# Patient Record
Sex: FEMALE | Race: WHITE | NOT HISPANIC OR LATINO | Employment: OTHER | URBAN - METROPOLITAN AREA
[De-identification: names, ages, dates, MRNs, and addresses within clinical notes are randomized per-mention and may not be internally consistent; named-entity substitution may affect disease eponyms.]

---

## 2021-04-08 DIAGNOSIS — Z23 ENCOUNTER FOR IMMUNIZATION: ICD-10-CM

## 2022-02-23 ENCOUNTER — APPOINTMENT (EMERGENCY)
Dept: CT IMAGING | Facility: HOSPITAL | Age: 72
End: 2022-02-23
Payer: COMMERCIAL

## 2022-02-23 ENCOUNTER — HOSPITAL ENCOUNTER (EMERGENCY)
Facility: HOSPITAL | Age: 72
Discharge: HOME | End: 2022-02-23
Attending: EMERGENCY MEDICINE | Admitting: EMERGENCY MEDICINE
Payer: COMMERCIAL

## 2022-02-23 VITALS
WEIGHT: 104 LBS | SYSTOLIC BLOOD PRESSURE: 147 MMHG | TEMPERATURE: 98.5 F | DIASTOLIC BLOOD PRESSURE: 67 MMHG | HEART RATE: 54 BPM | OXYGEN SATURATION: 99 % | RESPIRATION RATE: 14 BRPM

## 2022-02-23 DIAGNOSIS — R11.2 NAUSEA AND VOMITING: ICD-10-CM

## 2022-02-23 DIAGNOSIS — R10.9 ABDOMINAL PAIN: Primary | ICD-10-CM

## 2022-02-23 DIAGNOSIS — K80.20 CHOLELITHIASIS: ICD-10-CM

## 2022-02-23 LAB
ALBUMIN SERPL BCP-MCNC: 3.9 G/DL (ref 3.5–5)
ALP SERPL-CCNC: 72 U/L (ref 46–116)
ALT SERPL W P-5'-P-CCNC: 25 U/L (ref 12–78)
ANION GAP SERPL CALCULATED.3IONS-SCNC: 10 MMOL/L (ref 4–13)
AST SERPL W P-5'-P-CCNC: 19 U/L (ref 5–45)
BASOPHILS # BLD AUTO: 0.03 THOUSANDS/ΜL (ref 0–0.1)
BASOPHILS NFR BLD AUTO: 0 % (ref 0–1)
BILIRUB SERPL-MCNC: 0.5 MG/DL (ref 0.2–1)
BILIRUB UR QL STRIP: NEGATIVE
BUN SERPL-MCNC: 16 MG/DL (ref 5–25)
CALCIUM SERPL-MCNC: 8.4 MG/DL (ref 8.3–10.1)
CHLORIDE SERPL-SCNC: 99 MMOL/L (ref 100–108)
CLARITY UR: CLEAR
CO2 SERPL-SCNC: 25 MMOL/L (ref 21–32)
COLOR UR: YELLOW
CREAT SERPL-MCNC: 0.72 MG/DL (ref 0.6–1.3)
EOSINOPHIL # BLD AUTO: 0.01 THOUSAND/ΜL (ref 0–0.61)
EOSINOPHIL NFR BLD AUTO: 0 % (ref 0–6)
ERYTHROCYTE [DISTWIDTH] IN BLOOD BY AUTOMATED COUNT: 12.3 % (ref 11.6–15.1)
GFR SERPL CREATININE-BSD FRML MDRD: 85 ML/MIN/1.73SQ M
GLUCOSE SERPL-MCNC: 117 MG/DL (ref 65–140)
GLUCOSE UR STRIP-MCNC: NEGATIVE MG/DL
HCT VFR BLD AUTO: 39.8 % (ref 34.8–46.1)
HGB BLD-MCNC: 13.5 G/DL (ref 11.5–15.4)
HGB UR QL STRIP.AUTO: NEGATIVE
IMM GRANULOCYTES # BLD AUTO: 0.04 THOUSAND/UL (ref 0–0.2)
IMM GRANULOCYTES NFR BLD AUTO: 1 % (ref 0–2)
KETONES UR STRIP-MCNC: ABNORMAL MG/DL
LEUKOCYTE ESTERASE UR QL STRIP: NEGATIVE
LIPASE SERPL-CCNC: 62 U/L (ref 73–393)
LYMPHOCYTES # BLD AUTO: 0.78 THOUSANDS/ΜL (ref 0.6–4.47)
LYMPHOCYTES NFR BLD AUTO: 10 % (ref 14–44)
MCH RBC QN AUTO: 31.5 PG (ref 26.8–34.3)
MCHC RBC AUTO-ENTMCNC: 33.9 G/DL (ref 31.4–37.4)
MCV RBC AUTO: 93 FL (ref 82–98)
MONOCYTES # BLD AUTO: 0.27 THOUSAND/ΜL (ref 0.17–1.22)
MONOCYTES NFR BLD AUTO: 3 % (ref 4–12)
NEUTROPHILS # BLD AUTO: 6.84 THOUSANDS/ΜL (ref 1.85–7.62)
NEUTS SEG NFR BLD AUTO: 86 % (ref 43–75)
NITRITE UR QL STRIP: NEGATIVE
NRBC BLD AUTO-RTO: 0 /100 WBCS
PH UR STRIP.AUTO: 7 [PH]
PLATELET # BLD AUTO: 234 THOUSANDS/UL (ref 149–390)
PMV BLD AUTO: 9.3 FL (ref 8.9–12.7)
POTASSIUM SERPL-SCNC: 3.9 MMOL/L (ref 3.5–5.3)
PROT SERPL-MCNC: 7.3 G/DL (ref 6.4–8.2)
PROT UR STRIP-MCNC: NEGATIVE MG/DL
RBC # BLD AUTO: 4.29 MILLION/UL (ref 3.81–5.12)
SODIUM SERPL-SCNC: 134 MMOL/L (ref 136–145)
SP GR UR STRIP.AUTO: 1.01 (ref 1–1.03)
UROBILINOGEN UR QL STRIP.AUTO: 0.2 E.U./DL
WBC # BLD AUTO: 7.97 THOUSAND/UL (ref 4.31–10.16)

## 2022-02-23 PROCEDURE — 80053 COMPREHEN METABOLIC PANEL: CPT | Performed by: EMERGENCY MEDICINE

## 2022-02-23 PROCEDURE — 99284 EMERGENCY DEPT VISIT MOD MDM: CPT

## 2022-02-23 PROCEDURE — 99285 EMERGENCY DEPT VISIT HI MDM: CPT | Performed by: EMERGENCY MEDICINE

## 2022-02-23 PROCEDURE — 83690 ASSAY OF LIPASE: CPT | Performed by: EMERGENCY MEDICINE

## 2022-02-23 PROCEDURE — 74177 CT ABD & PELVIS W/CONTRAST: CPT

## 2022-02-23 PROCEDURE — 81003 URINALYSIS AUTO W/O SCOPE: CPT | Performed by: EMERGENCY MEDICINE

## 2022-02-23 PROCEDURE — 85025 COMPLETE CBC W/AUTO DIFF WBC: CPT | Performed by: EMERGENCY MEDICINE

## 2022-02-23 PROCEDURE — 93005 ELECTROCARDIOGRAM TRACING: CPT

## 2022-02-23 PROCEDURE — 96374 THER/PROPH/DIAG INJ IV PUSH: CPT

## 2022-02-23 PROCEDURE — 36415 COLL VENOUS BLD VENIPUNCTURE: CPT | Performed by: EMERGENCY MEDICINE

## 2022-02-23 PROCEDURE — G1004 CDSM NDSC: HCPCS

## 2022-02-23 PROCEDURE — 96375 TX/PRO/DX INJ NEW DRUG ADDON: CPT

## 2022-02-23 PROCEDURE — 96361 HYDRATE IV INFUSION ADD-ON: CPT

## 2022-02-23 RX ORDER — DICYCLOMINE HCL 20 MG
20 TABLET ORAL 2 TIMES DAILY PRN
Qty: 12 TABLET | Refills: 0 | Status: SHIPPED | OUTPATIENT
Start: 2022-02-23

## 2022-02-23 RX ORDER — ONDANSETRON 4 MG/1
4 TABLET, ORALLY DISINTEGRATING ORAL EVERY 6 HOURS PRN
Qty: 20 TABLET | Refills: 0 | Status: SHIPPED | OUTPATIENT
Start: 2022-02-23

## 2022-02-23 RX ORDER — KETOROLAC TROMETHAMINE 30 MG/ML
15 INJECTION, SOLUTION INTRAMUSCULAR; INTRAVENOUS ONCE
Status: COMPLETED | OUTPATIENT
Start: 2022-02-23 | End: 2022-02-23

## 2022-02-23 RX ORDER — ONDANSETRON 2 MG/ML
4 INJECTION INTRAMUSCULAR; INTRAVENOUS ONCE
Status: COMPLETED | OUTPATIENT
Start: 2022-02-23 | End: 2022-02-23

## 2022-02-23 RX ADMIN — IOHEXOL 100 ML: 350 INJECTION, SOLUTION INTRAVENOUS at 05:27

## 2022-02-23 RX ADMIN — KETOROLAC TROMETHAMINE 15 MG: 30 INJECTION, SOLUTION INTRAMUSCULAR; INTRAVENOUS at 04:47

## 2022-02-23 RX ADMIN — ONDANSETRON 4 MG: 2 INJECTION INTRAMUSCULAR; INTRAVENOUS at 04:42

## 2022-02-23 RX ADMIN — SODIUM CHLORIDE 1000 ML: 0.9 INJECTION, SOLUTION INTRAVENOUS at 04:47

## 2022-02-23 NOTE — ED PROVIDER NOTES
History  Chief Complaint   Patient presents with    Back Pain     pt c/o back pain  that radiates to abdomen  9p  pt vomited 3 times  pt also c/o of difficulty urinating     81-year-old female with no significant past medical history, not on any daily medication presents for evaluation of right-sided back and right upper quadrant abdominal pain that started at 21:00, sharp, constant, without any relieving exacerbating factors, she tried taking Tylenol without any relief is associated with nausea and some nonbilious nonbloody vomitus, no frequency urgency or dysuria, no hematuria  No similar pain in the past   Of note patient had an ultrasound of her right upper quadrant done approximately 1 month ago secondary to weight loss, was noted to have some gallstones without any pericholecystic inflammatory changes  She did not have any abdominal pain at that time  No kidney stones were noted on the ultrasound  She does not have any associated chest pain or shortness of breath, no fevers  Past surgical history includes laparoscopic tubal ligation          None       Past Medical History:   Diagnosis Date    Gall stones        Past Surgical History:   Procedure Laterality Date    ANKLE SURGERY      TUBAL LIGATION         History reviewed  No pertinent family history  I have reviewed and agree with the history as documented  E-Cigarette/Vaping    E-Cigarette Use Never User      E-Cigarette/Vaping Substances    Nicotine No     THC No     CBD No     Flavoring No     Other No     Unknown No      Social History     Tobacco Use    Smoking status: Never Smoker    Smokeless tobacco: Never Used   Vaping Use    Vaping Use: Never used   Substance Use Topics    Alcohol use: Not Currently    Drug use: Never       Review of Systems   Constitutional: Negative for appetite change and fever  HENT: Negative for rhinorrhea and sore throat  Eyes: Negative for photophobia and visual disturbance     Respiratory: Negative for cough, chest tightness and wheezing  Cardiovascular: Negative for chest pain, palpitations and leg swelling  Gastrointestinal: Positive for abdominal pain, nausea and vomiting  Negative for abdominal distention, blood in stool, constipation and diarrhea  Genitourinary: Negative for dysuria, flank pain, frequency, hematuria and urgency  Musculoskeletal: Negative for back pain  Skin: Negative for rash  Neurological: Negative for dizziness, weakness and headaches  All other systems reviewed and are negative  Physical Exam  Physical Exam  Vitals and nursing note reviewed  Constitutional:       Appearance: She is well-developed  HENT:      Head: Normocephalic and atraumatic  Eyes:      Pupils: Pupils are equal, round, and reactive to light  Cardiovascular:      Rate and Rhythm: Normal rate and regular rhythm  Heart sounds: No murmur heard  No friction rub  No gallop  Pulmonary:      Effort: Pulmonary effort is normal       Breath sounds: No wheezing or rales  Chest:      Chest wall: No tenderness  Abdominal:      General: There is no distension  Palpations: Abdomen is soft  There is no mass  Tenderness: There is abdominal tenderness  There is no guarding or rebound  Comments: Soft abdomen with tenderness in right mid abdomen without rebound or guarding   Musculoskeletal:      Cervical back: Normal range of motion and neck supple  Skin:     General: Skin is warm and dry  Neurological:      Mental Status: She is alert and oriented to person, place, and time           Vital Signs  ED Triage Vitals [02/23/22 0433]   Temperature Pulse Respirations Blood Pressure SpO2   98 5 °F (36 9 °C) 57 18 (!) 177/78 100 %      Temp src Heart Rate Source Patient Position - Orthostatic VS BP Location FiO2 (%)   -- Monitor Lying Right arm --      Pain Score       6           Vitals:    02/23/22 0433 02/23/22 0500   BP: (!) 177/78 147/67   Pulse: 57 (!) 54   Patient Position - Orthostatic VS: Lying          Visual Acuity      ED Medications  Medications   ondansetron (ZOFRAN) injection 4 mg (4 mg Intravenous Given 2/23/22 0442)   ketorolac (TORADOL) injection 15 mg (15 mg Intravenous Given 2/23/22 0447)   sodium chloride 0 9 % bolus 1,000 mL (0 mL Intravenous Stopped 2/23/22 0529)   iohexol (OMNIPAQUE) 350 MG/ML injection (SINGLE-DOSE) 100 mL (100 mL Intravenous Given 2/23/22 0527)       Diagnostic Studies  Results Reviewed     Procedure Component Value Units Date/Time    UA w Reflex to Microscopic w Reflex to Culture [[id number]]  (Abnormal) Collected: 02/23/22 0538    Lab Status: Final result Specimen: Urine, Clean Catch Updated: 02/23/22 0549     Color, UA Yellow     Clarity, UA Clear     Specific Gravity, UA 1 010     pH, UA 7 0     Leukocytes, UA Negative     Nitrite, UA Negative     Protein, UA Negative mg/dl      Glucose, UA Negative mg/dl      Ketones, UA 15 (1+) mg/dl      Urobilinogen, UA 0 2 E U /dl      Bilirubin, UA Negative     Blood, UA Negative    Comprehensive metabolic panel [[id number]]  (Abnormal) Collected: 02/23/22 0441    Lab Status: Final result Specimen: Blood from Arm, Right Updated: 02/23/22 0512     Sodium 134 mmol/L      Potassium 3 9 mmol/L      Chloride 99 mmol/L      CO2 25 mmol/L      ANION GAP 10 mmol/L      BUN 16 mg/dL      Creatinine 0 72 mg/dL      Glucose 117 mg/dL      Calcium 8 4 mg/dL      AST 19 U/L      ALT 25 U/L      Alkaline Phosphatase 72 U/L      Total Protein 7 3 g/dL      Albumin 3 9 g/dL      Total Bilirubin 0 50 mg/dL      eGFR 85 ml/min/1 73sq m     Narrative:      [place] guidelines for Chronic Kidney Disease (CKD):     Stage 1 with normal or high GFR (GFR > 90 mL/min/1 73 square meters)    Stage 2 Mild CKD (GFR = 60-89 mL/min/1 73 square meters)    Stage 3A Moderate CKD (GFR = 45-59 mL/min/1 73 square meters)    Stage 3B Moderate CKD (GFR = 30-44 mL/min/1 73 square meters)    Stage 4 Severe CKD (GFR = 15-29 mL/min/1 73 square meters)    Stage 5 End Stage CKD (GFR <15 mL/min/1 73 square meters)  Note: GFR calculation is accurate only with a steady state creatinine    Lipase [[id number]]  (Abnormal) Collected: 02/23/22 0441    Lab Status: Final result Specimen: Blood from Arm, Right Updated: 02/23/22 0512     Lipase 62 u/L     CBC and differential [[id number]]  (Abnormal) Collected: 02/23/22 0441    Lab Status: Final result Specimen: Blood from Arm, Right Updated: 02/23/22 0451     WBC 7 97 Thousand/uL      RBC 4 29 Million/uL      Hemoglobin 13 5 g/dL      Hematocrit 39 8 %      MCV 93 fL      MCH 31 5 pg      MCHC 33 9 g/dL      RDW 12 3 %      MPV 9 3 fL      Platelets 629 Thousands/uL      nRBC 0 /100 WBCs      Neutrophils Relative 86 %      Immat GRANS % 1 %      Lymphocytes Relative 10 %      Monocytes Relative 3 %      Eosinophils Relative 0 %      Basophils Relative 0 %      Neutrophils Absolute 6 84 Thousands/µL      Immature Grans Absolute 0 04 Thousand/uL      Lymphocytes Absolute 0 78 Thousands/µL      Monocytes Absolute 0 27 Thousand/µL      Eosinophils Absolute 0 01 Thousand/µL      Basophils Absolute 0 03 Thousands/µL                  CT abdomen pelvis with contrast   Final Result by Wai Michaels DO (02/23 0601)   1  Cholelithiasis without CT evidence for acute cholecystitis  If there is continued concern for acute cholecystitis, consider follow-up nuclear medicine HIDA scan to evaluate for cystic duct patency  2   Diffuse abnormal appearance of the sigmoid colon  Correlate for mild colitis, either infectious or inflammatory  Follow-up GI consultation recommended  3   Diffuse heterogeneous appearance of the liver with ill-defined area of enhancement in the inferior right lobe  Consider follow-up nonemergent right upper quadrant ultrasound  Ultimately, hepatic MRI may be necessary  4   Ill-defined enhancing lesion in the uterine fundus, likely uterine fibroid  Consider follow-up nonemergent pelvic ultrasound for further evaluation  The study was marked in [facility] for immediate notification  Workstation performed: [id number]                    Procedures  Procedures         ED Course  ED Course as of 02/23/22 0643   Wed Feb 23, 2022   0456 Procedure Note: EKG  Date/Time: 02/23/22 4:56 AM   Performed by: Severo Nay  Authorized by: Severo Nay  Indications / Diagnosis: Abdominal pain  ECG reviewed by me, the ED Provider: yes   The EKG demonstrates:  Rhythm:  sinus laure, 50  Intervals: normal intervals  Axis: normal axis  QRS/Blocks: normal QRS  ST Changes: No acute ST Changes, no STD/VU                                                  MDM  Number of Diagnoses or Management Options  Diagnosis management comments: 61-year-old female with right-sided abdominal pain will obtain lab work, imaging will re-evaluate      Disposition  Final diagnoses:   Abdominal pain   Nausea and vomiting   Cholelithiasis     Time reflects when diagnosis was documented in both MDM as applicable and the Disposition within this note     Time User Action Codes Description Comment    2/23/2022  6:11 AM Jasmin Needs Add [R10 9] Abdominal pain     2/23/2022  6:11 AM Jasmin Needs Add [R11 2] Nausea and vomiting     2/23/2022  6:13 AM [place] Needs Add [K80 20] Cholelithiasis       ED Disposition     ED Disposition Condition Date/Time Comment    Discharge Stable Wed Feb 23, 2022  6:25 AM Jessi Melchor discharge to home/self care              Follow-up Information     Follow up With Specialties Details Why Contact Info Additional Information    Malina Powell MD Family Medicine Schedule an appointment as soon as possible for a visit   [address]  [address] Emergency Department Emergency Medicine  If symptoms worsen [address] [place], [id number] [address] Emergency Department, [address] [place] [address], Brenden Dick 10    [address] [facility] Schedule an appointment as soon as possible for a visit   [facility] [address] [place] [address]  [facility] [address]  [phone]           Patient's Medications   Discharge Prescriptions    DICYCLOMINE (BENTYL) 20 MG TABLET    Take 1 tablet (20 mg total) by mouth 2 (two) times a day as needed (abdominal pain)       Start Date: 2/23/2022 End Date: --       Order Dose: 20 mg       Quantity: 12 tablet    Refills: 0    ONDANSETRON (ZOFRAN ODT) 4 MG DISINTEGRATING TABLET    Take 1 tablet (4 mg total) by mouth every 6 (six) hours as needed for nausea or vomiting       Start Date: 2/23/2022 End Date: --       Order Dose: 4 mg       Quantity: 20 tablet    Refills: 0       No discharge procedures on file      PDMP Review     None          ED Provider  Electronically Signed by           Mary Ellen Davies DO  02/23/22 6672

## 2022-02-24 LAB
ATRIAL RATE: 54 BPM
P AXIS: 68 DEGREES
PR INTERVAL: 146 MS
QRS AXIS: 74 DEGREES
QRSD INTERVAL: 86 MS
QT INTERVAL: 454 MS
QTC INTERVAL: 430 MS
T WAVE AXIS: 62 DEGREES
VENTRICULAR RATE: 54 BPM

## 2022-02-24 PROCEDURE — 93010 ELECTROCARDIOGRAM REPORT: CPT | Performed by: INTERNAL MEDICINE

## 2022-03-03 ENCOUNTER — OFFICE VISIT (OUTPATIENT)
Dept: GASTROENTEROLOGY | Facility: CLINIC | Age: 72
End: 2022-03-03
Payer: COMMERCIAL

## 2022-03-03 VITALS
SYSTOLIC BLOOD PRESSURE: 122 MMHG | WEIGHT: 103.4 LBS | HEIGHT: 59 IN | BODY MASS INDEX: 20.84 KG/M2 | DIASTOLIC BLOOD PRESSURE: 84 MMHG

## 2022-03-03 DIAGNOSIS — R10.11 RIGHT UPPER QUADRANT PAIN: Primary | ICD-10-CM

## 2022-03-03 DIAGNOSIS — R93.89 ABNORMAL CT SCAN: ICD-10-CM

## 2022-03-03 DIAGNOSIS — R10.9 FLANK PAIN: ICD-10-CM

## 2022-03-03 PROCEDURE — 99204 OFFICE O/P NEW MOD 45 MIN: CPT | Performed by: NURSE PRACTITIONER

## 2022-03-03 NOTE — PROGRESS NOTES
[facility] - Outpatient Consultation  Elke Gonzalez 73 y o  female MRN: [id number]  Encounter: [id number]    ASSESSMENT AND PLAN:    1  Right back pain radiating to right upper quadrant  2 Cholelithiasis  Recent acute episode of severe right flank pain radiating laterally to right upper quadrant   Recent abdominal bloating and frequent belching   CT scan in the ED showed cholelithiasis but no acute cholecystitis   LFTs, CBC within normal limits  Possible biliary colic  Would also consider esophagitis, gastritis, PUD  - check HIDA scan to rule out cholecystitis  - scheduled for EGD at [facility]  - recommend low-fat, high-protein diet    3  Colitis/ constipation  4  Weight loss  5  Screening for colon cancer   12-15 lb weight loss over past year with no change in appetite  Recent CT scan of the abdomen showed abnormality at sigmoid colon-possible colitis, increased fecal burden  Patient reports recent constipation  Last colonoscopy was done at [facility] in 2010 -diverticulosis torturous colon, redundant colon  10 year recall  Overdue for screening  Recommend proceeding with colonoscopy to rule out colon malignancy, large polyps  -scheduled for combo EGD / colonoscopy at [facility]  - continue MiraLax, increase to b i d  Followup Appointment:  2 months  ______________________________________________________________________    Chief Complaint   Patient presents with    ED at [address] [place] [address] right, lower back pain       HPI:   Elke Gonzalez is a 73y o  year old female who presents after recent ED visit due to severe  right flank pain x 24 hours which radiated laterally to  Right ribcage  She denies right upper quadrant or abdominal pain but reports nausea and vomiting   Was unable to sleep due to pain    CT scan abdomen in ED 2/23 showed mild sigmoid colitis,  Coli lithiasis without evidence of acute cholecystitis, ill-defined area of enhancement in the inferior right lobe of liver  Also noted to have increased fecal burden, up to level is sigmoid colon  She received IV Toradol with resolution of her pain    Since then, she is  experiencing frequent belching, early satiety and upper abdominal bloating  Reports mild epigastric discomfort after eating but denies esophageal burning or reflux  Describes right upper quadrant cramping or tightness, no furtherr back pain  Apetite is fair, no further nausea or vomiting  She has had 12-15 lb weight loss over the past year  PCP ordered abdominal ultrasound in January 2022 which showed cholelithiasis but no other abnormal finding  Also experiencing constipation over the past 1-2 months  Reports hard firm bowel movement every 1-2 days, no melena or hematochezia  Has been taking MiraLax daily x5 days and reports soft, decreased caliber stools    Last colonoscopy was done at [facility] in 2010 -diverticulosis torturous colon, redundant colon  10 year recall    Historical Information   Past Medical History:   Diagnosis Date    Gall stones      Past Surgical History:   Procedure Laterality Date    ANKLE SURGERY      TUBAL LIGATION       Social History     Substance and Sexual Activity   Alcohol Use Not Currently     Social History     Substance and Sexual Activity   Drug Use Never     Social History     Tobacco Use   Smoking Status Never Smoker   Smokeless Tobacco Never Used     Family History   Problem Relation Age of Onset    COPD Mother     Hyperlipidemia Mother     Colon polyps Father     Hypertension Father     Hyperlipidemia Father     Hypertension Sister     Hyperlipidemia Sister     Colon cancer Neg Hx        Meds/Allergies     Current Outpatient Medications:     dicyclomine (BENTYL) 20 mg tablet    ondansetron (Zofran ODT) 4 mg disintegrating tablet    No Known Allergies    PHYSICAL EXAM:    Blood pressure 122/84, height 4' 11" (1 499 m), weight 46 9 kg (103 lb 6 4 oz)   Body mass index is 20 88 kg/m²  General Appearance: NAD, cooperative, alert  Eyes: Anicteric  ENT:  Normocephalic, atraumatic, normal mucosa  Neck:  Supple, symmetrical, trachea midline,   Resp:  Clear to auscultation bilaterally; no rales, rhonchi or wheezing; respirations unlabored   CV:  S1 S2, Regular rate and rhythm; no murmur, rub, or gallop  GI:  Soft, non-tender, non-distended; normal bowel sounds; no masses, no organomegaly   Rectal: Deferred  Musculoskeletal: No cyanosis, clubbing or edema  Normal ROM  Skin:  No jaundice, rashes, or lesions   Psych: Normal affect, good eye contact  Neuro: No gross deficits, AAOx3    Lab Results:   Lab Results   Component Value Date    WBC 7 97 02/23/2022    HGB 13 5 02/23/2022    HCT 39 8 02/23/2022    MCV 93 02/23/2022     02/23/2022     Lab Results   Component Value Date    K 3 9 02/23/2022    CL 99 (L) 02/23/2022    CO2 25 02/23/2022    BUN 16 02/23/2022    CREATININE 0 72 02/23/2022    CALCIUM 8 4 02/23/2022    AST 19 02/23/2022    ALT 25 02/23/2022    ALKPHOS 72 02/23/2022    EGFR 85 02/23/2022     No results found for: IRON, TIBC, FERRITIN  Lab Results   Component Value Date    LIPASE 62 (L) 02/23/2022       Radiology Results:   CT abdomen pelvis with contrast    Result Date: 2/23/2022  Narrative: CT ABDOMEN AND PELVIS WITH IV CONTRAST INDICATION:   abdominal pain, vomiting, gallstones  Back pain  pt c/o back pain  that radiates to abdomen  9p  pt vomited 3 times  pt also c/o of difficulty urinating 62-year-old female with no significant past medical history, not on any daily medication presents for evaluation of right-sided back and right upper quadrant abdominal pain that started at 21:00, sharp, constant, without any relieving exacerbating factors, she tried taking Tylenol without any relief is associated with nausea and some nonbilious nonbloody vomitus, no frequency urgency or dysuria, no hematuria    No similar pain in the past   Of note patient had an ultrasound of her right upper quadrant done approximately 1 month ago secondary to weight loss, was noted to have some gallstones without any pericholecystic inflammatory changes  She did not have any abdominal pain at that time  No kidney stones were noted on the ultrasound  She does not have any associated chest pain or shortness of breath, no fevers  Past surgical history includes laparoscopic tubal ligation The patient's entire past medical history was obtained directly from either the attending emergency room physicians notes and/or the resident/physician's assistant notes in [address] [place] [address] Rd  The information was copied and pasted directly from Epic  COMPARISON:  None  TECHNIQUE:  CT examination of the abdomen and pelvis was performed  Axial, sagittal, and coronal 2D reformatted images were created from the source data and submitted for interpretation  Radiation dose length product (DLP) for this visit:  413 28 mGy-cm   This examination, like all CT scans performed in the [facility], was performed utilizing techniques to minimize radiation dose exposure, including the use of iterative  reconstruction and automated exposure control  IV Contrast:  100 mL of iohexol (OMNIPAQUE) Enteric Contrast:  Enteric contrast was not administered  FINDINGS: ABDOMEN LOWER CHEST:  No clinically significant abnormality identified in the visualized lower chest  LIVER/BILIARY TREE:  The liver is heterogeneous in CT density  Ill-defined area of heterogeneous enhancement in the inferior aspect of the liver (series 2, image 36; series 601, image 42)  No discrete hepatic mass  Mild nonspecific periportal edema  GALLBLADDER:  The gallbladder is distended  Numerous gallbladder calculi  No pericholecystic inflammation or gallbladder wall thickening  The common bile duct is normal in caliber  No evidence of choledocholithiasis  SPLEEN:  Unremarkable  PANCREAS:  Unremarkable  ADRENAL GLANDS:  Unremarkable   KIDNEYS/URETERS:  No hydronephrosis or urinary tract calculus  One or more sharply circumscribed subcentimeter renal hypodensities are present, too small to accurately characterize, and statistically most likely benign findings  According to recent literature (Radiology 2019) no further workup of these findings is recommended  STOMACH AND BOWEL:  Evaluation of the GI tract is somewhat limited due to lack of oral contrast material as well as lack of intra-abdominal fat  The stomach is relatively decompressed and inadequately evaluated  Hiatal hernia  No evidence of small bowel obstruction  The colon is segmentally distended with feces, up to the level of the sigmoid colon  Increased fecal burden, up to the level of the sigmoid colon  Distally, the sigmoid colon is relatively decompressed  There is mild wall edema of the sigmoid colon  Scattered diverticula throughout the entire sigmoid colon  Nothing to suggest acute diverticulitis  APPENDIX:  No findings to suggest appendicitis  ABDOMINOPELVIC CAVITY:  Trace free fluid in the pelvis  Mild edema of the mesentery  No pneumoperitoneum  No lymphadenopathy  VESSELS:  Mild atherosclerotic changes in the abdominal aorta  Mild fusiform ectasia of the infrarenal abdominal aorta, extending into the common iliac bifurcation  No evidence of aneurysm or dissection  No flow-limiting stenosis  There is mild prominence of the uterine and adnexal vessels bilaterally, consistent with pelvic venous congestion  PELVIS REPRODUCTIVE ORGANS:  The uterus is retroverted  Enhancing 1 7 cm lesion in the uterine fundus (series 2, image 57; series 602, image 86)  URINARY BLADDER:  Incompletely distended  Mild circumferential wall thickening  Given the negative urine analysis, the findings are most compatible with under distention  ABDOMINAL WALL/INGUINAL REGIONS:  Small umbilical hernia containing fat (series 602, image 92)  OSSEOUS STRUCTURES:  No acute fracture or destructive osseous lesion    Spinal degenerative changes are noted  This is most pronounced L4-L5  Vacuum disc formation at L2-L3 and L5-S1  Impression: 1  Cholelithiasis without CT evidence for acute cholecystitis  If there is continued concern for acute cholecystitis, consider follow-up nuclear medicine HIDA scan to evaluate for cystic duct patency  2   Diffuse abnormal appearance of the sigmoid colon  Correlate for mild colitis, either infectious or inflammatory  Follow-up GI consultation recommended  3   Diffuse heterogeneous appearance of the liver with ill-defined area of enhancement in the inferior right lobe  Consider follow-up nonemergent right upper quadrant ultrasound  Ultimately, hepatic MRI may be necessary  4   Ill-defined enhancing lesion in the uterine fundus, likely uterine fibroid  Consider follow-up nonemergent pelvic ultrasound for further evaluation  The study was marked in [facility] for immediate notification  Workstation performed: [id number]         REVIEW OF SYSTEMS:    CONSTITUTIONAL: Denies any fever, chills, rigors, and weight loss  HEENT: No earache or tinnitus  Denies hearing loss or visual disturbances  CARDIOVASCULAR: No chest pain or palpitations  RESPIRATORY: Denies any cough, hemoptysis, shortness of breath or dyspnea on exertion  GASTROINTESTINAL: As noted in the History of Present Illness  GENITOURINARY: No problems with urination  Denies any hematuria or dysuria  NEUROLOGIC: No dizziness or vertigo, denies headaches  MUSCULOSKELETAL: Denies any muscle or joint pain  SKIN: Denies skin rashes or itching  ENDOCRINE: Denies excessive thirst  Denies intolerance to heat or cold  PSYCHOSOCIAL: Denies depression or anxiety  Denies any recent memory loss

## 2022-03-03 NOTE — TELEPHONE ENCOUNTER
Scheduled date of colonoscopy (as of today):4/19/22  Physician performing colonoscopy:Dr Rolan Fernandes  Location of colonoscopy: [facility] endoscopy  Bowel prep reviewed with patient: Clenpiq  (sample)  Instructions reviewed with patient by: Sterling Santa  Clearances: None

## 2022-03-03 NOTE — LETTER
March 3, 2022     Sumanth Crawford, [address] [place] [address]    Patient: Oksana December   YOB: 1950   Date of Visit: 3/3/2022       Dear Dr Nunn Members: Thank you for referring [facility] to me for evaluation  Below are my notes for this consultation  If you have questions, please do not hesitate to call me  I look forward to following your patient along with you  Sincerely,        ALIYAH Hernandez        CC: No Recipients  ALIYAH Hernandez  3/3/2022  5:41 PM  Sign when Signing Visit    [facility] - Outpatient Consultation  Oksana December 73 y o  female MRN: [id number]  Encounter: [id number]    ASSESSMENT AND PLAN:    1  Right back pain radiating to right upper quadrant  2 Cholelithiasis  Recent acute episode of severe right flank pain radiating laterally to right upper quadrant   Recent abdominal bloating and frequent belching   CT scan in the ED showed cholelithiasis but no acute cholecystitis   LFTs, CBC within normal limits  Possible biliary colic  Would also consider esophagitis, gastritis, PUD  - check HIDA scan to rule out cholecystitis  - scheduled for EGD at [facility]  - recommend low-fat, high-protein diet    3  Colitis/ constipation  4  Weight loss  5  Screening for colon cancer   12-15 lb weight loss over past year with no change in appetite  Recent CT scan of the abdomen showed abnormality at sigmoid colon-possible colitis, increased fecal burden  Patient reports recent constipation  Last colonoscopy was done at [facility] in 2010 -diverticulosis torturous colon, redundant colon  10 year recall  Overdue for screening  Recommend proceeding with colonoscopy to rule out colon malignancy, large polyps  -scheduled for combo EGD / colonoscopy at [facility]  - continue MiraLax, increase to b i d        Followup Appointment:  2 months  ______________________________________________________________________    Chief Complaint   Patient presents with   [facility] ED at [address] right, lower back pain       HPI:   Aparna Francois is a 73y o  year old female who presents after recent ED visit due to severe  right flank pain x 24 hours which radiated laterally to  Right ribcage  She denies right upper quadrant or abdominal pain but reports nausea and vomiting  Was unable to sleep due to pain    CT scan abdomen in ED 2/23 showed mild sigmoid colitis,  Coli lithiasis without evidence of acute cholecystitis, ill-defined area of enhancement in the inferior right lobe of liver  Also noted to have increased fecal burden, up to level is sigmoid colon  She received IV Toradol with resolution of her pain    Since then, she is  experiencing frequent belching, early satiety and upper abdominal bloating  Reports mild epigastric discomfort after eating but denies esophageal burning or reflux  Describes right upper quadrant cramping or tightness, no furtherr back pain  Apetite is fair, no further nausea or vomiting  She has had 12-15 lb weight loss over the past year  PCP ordered abdominal ultrasound in January 2022 which showed cholelithiasis but no other abnormal finding  Also experiencing constipation over the past 1-2 months  Reports hard firm bowel movement every 1-2 days, no melena or hematochezia  Has been taking MiraLax daily x5 days and reports soft, decreased caliber stools    Last colonoscopy was done at [facility] in 2010 -diverticulosis torturous colon, redundant colon   10 year recall    Historical Information   Past Medical History:   Diagnosis Date    Gall stones      Past Surgical History:   Procedure Laterality Date    ANKLE SURGERY      TUBAL LIGATION       Social History     Substance and Sexual Activity   Alcohol Use Not Currently     Social History     Substance and Sexual Activity   Drug Use Never     Social History     Tobacco Use   Smoking Status Never Smoker   Smokeless Tobacco Never Used     Family History   Problem Relation Age of Onset   Mario Montgomery COPD Mother     Hyperlipidemia Mother     Colon polyps Father     Hypertension Father     Hyperlipidemia Father     Hypertension Sister     Hyperlipidemia Sister     Colon cancer Neg Hx        Meds/Allergies     Current Outpatient Medications:     dicyclomine (BENTYL) 20 mg tablet    ondansetron (Zofran ODT) 4 mg disintegrating tablet    No Known Allergies    PHYSICAL EXAM:    Blood pressure 122/84, height 4' 11" (1 499 m), weight 46 9 kg (103 lb 6 4 oz)  Body mass index is 20 88 kg/m²  General Appearance: NAD, cooperative, alert  Eyes: Anicteric  ENT:  Normocephalic, atraumatic, normal mucosa  Neck:  Supple, symmetrical, trachea midline,   Resp:  Clear to auscultation bilaterally; no rales, rhonchi or wheezing; respirations unlabored   CV:  S1 S2, Regular rate and rhythm; no murmur, rub, or gallop  GI:  Soft, non-tender, non-distended; normal bowel sounds; no masses, no organomegaly   Rectal: Deferred  Musculoskeletal: No cyanosis, clubbing or edema  Normal ROM    Skin:  No jaundice, rashes, or lesions   Psych: Normal affect, good eye contact  Neuro: No gross deficits, AAOx3    Lab Results:   Lab Results   Component Value Date    WBC 7 97 02/23/2022    HGB 13 5 02/23/2022    HCT 39 8 02/23/2022    MCV 93 02/23/2022     02/23/2022     Lab Results   Component Value Date    K 3 9 02/23/2022    CL 99 (L) 02/23/2022    CO2 25 02/23/2022    BUN 16 02/23/2022    CREATININE 0 72 02/23/2022    CALCIUM 8 4 02/23/2022    AST 19 02/23/2022    ALT 25 02/23/2022    ALKPHOS 72 02/23/2022    EGFR 85 02/23/2022     No results found for: IRON, TIBC, FERRITIN  Lab Results   Component Value Date    LIPASE 62 (L) 02/23/2022       Radiology Results:   CT abdomen pelvis with contrast    Result Date: 2/23/2022  Narrative: CT ABDOMEN AND PELVIS WITH IV CONTRAST INDICATION:   abdominal pain, vomiting, gallstones  Back pain  pt c/o back pain  that radiates to abdomen  9p  pt vomited 3 times  pt also c/o of difficulty urinating 73-year-old female with no significant past medical history, not on any daily medication presents for evaluation of right-sided back and right upper quadrant abdominal pain that started at 21:00, sharp, constant, without any relieving exacerbating factors, she tried taking Tylenol without any relief is associated with nausea and some nonbilious nonbloody vomitus, no frequency urgency or dysuria, no hematuria  No similar pain in the past   Of note patient had an ultrasound of her right upper quadrant done approximately 1 month ago secondary to weight loss, was noted to have some gallstones without any pericholecystic inflammatory changes  She did not have any abdominal pain at that time  No kidney stones were noted on the ultrasound  She does not have any associated chest pain or shortness of breath, no fevers  Past surgical history includes laparoscopic tubal ligation The patient's entire past medical history was obtained directly from either the attending emergency room physicians notes and/or the resident/physician's assistant notes in [address] [place] [address]  The information was copied and pasted directly from Epic  COMPARISON:  None  TECHNIQUE:  CT examination of the abdomen and pelvis was performed  Axial, sagittal, and coronal 2D reformatted images were created from the source data and submitted for interpretation  Radiation dose length product (DLP) for this visit:  413 28 mGy-cm   This examination, like all CT scans performed in the [facility], was performed utilizing techniques to minimize radiation dose exposure, including the use of iterative  reconstruction and automated exposure control  IV Contrast:  100 mL of iohexol (OMNIPAQUE) Enteric Contrast:  Enteric contrast was not administered   FINDINGS: ABDOMEN LOWER CHEST:  No clinically significant abnormality identified in the visualized lower chest  LIVER/BILIARY TREE:  The liver is heterogeneous in CT density  Ill-defined area of heterogeneous enhancement in the inferior aspect of the liver (series 2, image 36; series 601, image 42)  No discrete hepatic mass  Mild nonspecific periportal edema  GALLBLADDER:  The gallbladder is distended  Numerous gallbladder calculi  No pericholecystic inflammation or gallbladder wall thickening  The common bile duct is normal in caliber  No evidence of choledocholithiasis  SPLEEN:  Unremarkable  PANCREAS:  Unremarkable  ADRENAL GLANDS:  Unremarkable  KIDNEYS/URETERS:  No hydronephrosis or urinary tract calculus  One or more sharply circumscribed subcentimeter renal hypodensities are present, too small to accurately characterize, and statistically most likely benign findings  According to recent literature (Radiology 2019) no further workup of these findings is recommended  STOMACH AND BOWEL:  Evaluation of the GI tract is somewhat limited due to lack of oral contrast material as well as lack of intra-abdominal fat  The stomach is relatively decompressed and inadequately evaluated  Hiatal hernia  No evidence of small bowel obstruction  The colon is segmentally distended with feces, up to the level of the sigmoid colon  Increased fecal burden, up to the level of the sigmoid colon  Distally, the sigmoid colon is relatively decompressed  There is mild wall edema of the sigmoid colon  Scattered diverticula throughout the entire sigmoid colon  Nothing to suggest acute diverticulitis  APPENDIX:  No findings to suggest appendicitis  ABDOMINOPELVIC CAVITY:  Trace free fluid in the pelvis  Mild edema of the mesentery  No pneumoperitoneum  No lymphadenopathy  VESSELS:  Mild atherosclerotic changes in the abdominal aorta  Mild fusiform ectasia of the infrarenal abdominal aorta, extending into the common iliac bifurcation  No evidence of aneurysm or dissection  No flow-limiting stenosis   There is mild prominence of the uterine and adnexal vessels bilaterally, consistent with pelvic venous congestion  PELVIS REPRODUCTIVE ORGANS:  The uterus is retroverted  Enhancing 1 7 cm lesion in the uterine fundus (series 2, image 57; series 602, image 86)  URINARY BLADDER:  Incompletely distended  Mild circumferential wall thickening  Given the negative urine analysis, the findings are most compatible with under distention  ABDOMINAL WALL/INGUINAL REGIONS:  Small umbilical hernia containing fat (series 602, image 92)  OSSEOUS STRUCTURES:  No acute fracture or destructive osseous lesion  Spinal degenerative changes are noted  This is most pronounced L4-L5  Vacuum disc formation at L2-L3 and L5-S1  Impression: 1  Cholelithiasis without CT evidence for acute cholecystitis  If there is continued concern for acute cholecystitis, consider follow-up nuclear medicine HIDA scan to evaluate for cystic duct patency  2   Diffuse abnormal appearance of the sigmoid colon  Correlate for mild colitis, either infectious or inflammatory  Follow-up GI consultation recommended  3   Diffuse heterogeneous appearance of the liver with ill-defined area of enhancement in the inferior right lobe  Consider follow-up nonemergent right upper quadrant ultrasound  Ultimately, hepatic MRI may be necessary  4   Ill-defined enhancing lesion in the uterine fundus, likely uterine fibroid  Consider follow-up nonemergent pelvic ultrasound for further evaluation  The study was marked in [facility] for immediate notification  Workstation performed: [id number]         REVIEW OF SYSTEMS:    CONSTITUTIONAL: Denies any fever, chills, rigors, and weight loss  HEENT: No earache or tinnitus  Denies hearing loss or visual disturbances  CARDIOVASCULAR: No chest pain or palpitations  RESPIRATORY: Denies any cough, hemoptysis, shortness of breath or dyspnea on exertion  GASTROINTESTINAL: As noted in the History of Present Illness     GENITOURINARY: No problems with urination  Denies any hematuria or dysuria  NEUROLOGIC: No dizziness or vertigo, denies headaches  MUSCULOSKELETAL: Denies any muscle or joint pain  SKIN: Denies skin rashes or itching  ENDOCRINE: Denies excessive thirst  Denies intolerance to heat or cold  PSYCHOSOCIAL: Denies depression or anxiety  Denies any recent memory loss

## 2022-03-04 RX ORDER — SODIUM PICOSULFATE, MAGNESIUM OXIDE, AND ANHYDROUS CITRIC ACID 10; 3.5; 12 MG/160ML; G/160ML; G/160ML
LIQUID ORAL
Qty: 320 ML | Refills: 0 | Status: SHIPPED | COMMUNITY
Start: 2022-03-04 | End: 2022-04-19 | Stop reason: HOSPADM

## 2022-03-10 ENCOUNTER — HOSPITAL ENCOUNTER (OUTPATIENT)
Dept: NUCLEAR MEDICINE | Facility: HOSPITAL | Age: 72
Discharge: HOME | End: 2022-03-10
Payer: COMMERCIAL

## 2022-03-10 DIAGNOSIS — R10.11 RIGHT UPPER QUADRANT PAIN: ICD-10-CM

## 2022-03-10 PROCEDURE — A9537 TC99M MEBROFENIN: HCPCS

## 2022-03-10 PROCEDURE — 78227 HEPATOBIL SYST IMAGE W/DRUG: CPT

## 2022-03-10 RX ADMIN — SINCALIDE 0.9 MCG: 5 INJECTION, POWDER, LYOPHILIZED, FOR SOLUTION INTRAVENOUS at 13:24

## 2022-04-19 ENCOUNTER — HOSPITAL ENCOUNTER (OUTPATIENT)
Dept: GASTROENTEROLOGY | Facility: AMBULATORY SURGERY CENTER | Age: 72
Discharge: HOME | End: 2022-04-19
Payer: COMMERCIAL

## 2022-04-19 ENCOUNTER — ANESTHESIA (OUTPATIENT)
Dept: GASTROENTEROLOGY | Facility: AMBULATORY SURGERY CENTER | Age: 72
End: 2022-04-19

## 2022-04-19 ENCOUNTER — ANESTHESIA EVENT (OUTPATIENT)
Dept: GASTROENTEROLOGY | Facility: AMBULATORY SURGERY CENTER | Age: 72
End: 2022-04-19

## 2022-04-19 VITALS
BODY MASS INDEX: 20.18 KG/M2 | RESPIRATION RATE: 19 BRPM | HEART RATE: 57 BPM | TEMPERATURE: 97.9 F | HEIGHT: 59 IN | OXYGEN SATURATION: 98 % | WEIGHT: 100.09 LBS | DIASTOLIC BLOOD PRESSURE: 68 MMHG | SYSTOLIC BLOOD PRESSURE: 97 MMHG

## 2022-04-19 DIAGNOSIS — R10.11 RIGHT UPPER QUADRANT PAIN: ICD-10-CM

## 2022-04-19 DIAGNOSIS — K52.9 COLITIS: ICD-10-CM

## 2022-04-19 DIAGNOSIS — R93.89 ABNORMAL CT SCAN: ICD-10-CM

## 2022-04-19 DIAGNOSIS — R63.4 WEIGHT LOSS: ICD-10-CM

## 2022-04-19 PROCEDURE — 43235 EGD DIAGNOSTIC BRUSH WASH: CPT | Performed by: INTERNAL MEDICINE

## 2022-04-19 PROCEDURE — 45378 DIAGNOSTIC COLONOSCOPY: CPT | Performed by: INTERNAL MEDICINE

## 2022-04-19 RX ORDER — SODIUM CHLORIDE 9 MG/ML
INJECTION, SOLUTION INTRAVENOUS CONTINUOUS PRN
Status: DISCONTINUED | OUTPATIENT
Start: 2022-04-19 | End: 2022-04-19

## 2022-04-19 RX ORDER — PROPOFOL 10 MG/ML
INJECTION, EMULSION INTRAVENOUS AS NEEDED
Status: DISCONTINUED | OUTPATIENT
Start: 2022-04-19 | End: 2022-04-19

## 2022-04-19 RX ORDER — SODIUM CHLORIDE, SODIUM LACTATE, POTASSIUM CHLORIDE, CALCIUM CHLORIDE 600; 310; 30; 20 MG/100ML; MG/100ML; MG/100ML; MG/100ML
100 INJECTION, SOLUTION INTRAVENOUS CONTINUOUS
Status: CANCELLED | OUTPATIENT
Start: 2022-04-19

## 2022-04-19 RX ORDER — SODIUM CHLORIDE 9 MG/ML
50 INJECTION, SOLUTION INTRAVENOUS CONTINUOUS
Status: DISCONTINUED | OUTPATIENT
Start: 2022-04-19 | End: 2022-04-23 | Stop reason: HOSPADM

## 2022-04-19 RX ADMIN — SODIUM CHLORIDE 50 ML/HR: 9 INJECTION, SOLUTION INTRAVENOUS at 08:52

## 2022-04-19 RX ADMIN — PROPOFOL 100 MG: 10 INJECTION, EMULSION INTRAVENOUS at 09:18

## 2022-04-19 RX ADMIN — PROPOFOL 40 MG: 10 INJECTION, EMULSION INTRAVENOUS at 09:40

## 2022-04-19 RX ADMIN — PROPOFOL 30 MG: 10 INJECTION, EMULSION INTRAVENOUS at 09:35

## 2022-04-19 RX ADMIN — PROPOFOL 50 MG: 10 INJECTION, EMULSION INTRAVENOUS at 09:27

## 2022-04-19 RX ADMIN — PROPOFOL 30 MG: 10 INJECTION, EMULSION INTRAVENOUS at 09:31

## 2022-04-19 RX ADMIN — PROPOFOL 50 MG: 10 INJECTION, EMULSION INTRAVENOUS at 09:23

## 2022-04-19 RX ADMIN — SODIUM CHLORIDE: 9 INJECTION, SOLUTION INTRAVENOUS at 09:11

## 2022-04-19 NOTE — H&P
History and Physical - [facility]  Nayely Ward 73 y o  female MRN: [id number]    HPI: Nayely Ward is a 73y o  year old female who presents for EGD and colonoscopy secondary to abdominal pain and constipation with abnormal CT scan    REVIEW OF SYSTEMS: Per the HPI, and otherwise unremarkable      Historical Information   Past Medical History:   Diagnosis Date    Gall stones      Past Surgical History:   Procedure Laterality Date    ANKLE SURGERY      COLONOSCOPY      TUBAL LIGATION       Social History   Social History     Substance and Sexual Activity   Alcohol Use Not Currently     Social History     Substance and Sexual Activity   Drug Use Never     Social History     Tobacco Use   Smoking Status Never Smoker   Smokeless Tobacco Never Used     Family History   Problem Relation Age of Onset    COPD Mother     Hyperlipidemia Mother     Colon polyps Father     Hypertension Father     Hyperlipidemia Father     Hypertension Sister     Hyperlipidemia Sister     Colon cancer Neg Hx        Meds/Allergies       Current Outpatient Medications:     Calcium Ascorbate (BUFFERED C PO)    Cholecalciferol 125 MCG (5000 UT) TABS    ECHINACEA-DELGADILLO SEAL PO    Omega-3 Fatty Acids (SUPER OMEGA 3 EPA/DHA PO)    patient supplied medication    patient supplied medication    Sod Picosulfate-Mag Ox-Cit Acd (Clenpiq) 10-3 5-12 MG-GM -GM/160ML SOLN    SUPER B COMPLEX/C PO    ZINC PICOLINATE PO    dicyclomine (BENTYL) 20 mg tablet    GLUCOSAMINE SULFATE/MSM PO    OMEGA-3 FATTY ACIDS PO    ondansetron (Zofran ODT) 4 mg disintegrating tablet    Current Facility-Administered Medications:     sodium chloride 0 9 % infusion, 50 mL/hr, Intravenous, Continuous, 50 mL/hr at 04/19/22 0852    No Known Allergies    Objective     /70   Pulse (!) 54   Temp 97 9 °F (36 6 °C) (Temporal)   Resp 16   Ht 4' 11" (1 499 m)   Wt 45 4 kg (100 lb 1 4 oz)   SpO2 99%   BMI 20 22 kg/m² PHYSICAL EXAM    Gen: NAD AAOx3  Head: Normocephalic, Atraumatic  CV: S1S2 RRR no m/r/g  CHEST: Clear b/l no c/r/w  ABD: soft, +BS NT/ND  EXT: no edema    ASSESSMENT/PLAN:  This is a 73y o  year old female here for EGD and colonoscopy secondary to abdominal pain with constipation and abnormal CT scan, and she is stable and optimized for her procedure

## 2022-04-19 NOTE — ANESTHESIA PREPROCEDURE EVALUATION
Procedure:  COLONOSCOPY  EGD    Relevant Problems   No relevant active problems   Flank pain    2/23/22 CT abdomen    1  Cholelithiasis without CT evidence for acute cholecystitis  If there is continued concern for acute cholecystitis, consider follow-up nuclear medicine HIDA scan to evaluate for cystic duct patency      2  Diffuse abnormal appearance of the sigmoid colon  Correlate for mild colitis, either infectious or inflammatory  Follow-up GI consultation recommended      3  Diffuse heterogeneous appearance of the liver with ill-defined area of enhancement in the inferior right lobe  Consider follow-up nonemergent right upper quadrant ultrasound  Ultimately, hepatic MRI may be necessary      4   Ill-defined enhancing lesion in the uterine fundus, likely uterine fibroid  Consider follow-up nonemergent pelvic ultrasound for further evaluation  Physical Exam    Airway    Mallampati score: II  TM Distance: >3 FB  Neck ROM: full     Dental   No notable dental hx     Cardiovascular  Cardiovascular exam normal    Pulmonary  Pulmonary exam normal     Other Findings        Anesthesia Plan  ASA Score- 2     Anesthesia Type- IV sedation with anesthesia with ASA Monitors  Additional Monitors:   Airway Plan:           Plan Factors-Exercise tolerance (METS): >4 METS  Chart reviewed  Imaging results reviewed  Patient summary reviewed  Patient is not a current smoker  Induction- intravenous  Postoperative Plan-     Informed Consent- Anesthetic plan and risks discussed with patient  I personally reviewed this patient with the CRNA  Discussed and agreed on the Anesthesia Plan with the CRNA  Danish Gusman

## 2022-04-19 NOTE — DISCHARGE INSTRUCTIONS
Upper Endoscopy   WHAT YOU NEED TO KNOW:   An upper endoscopy is also called an upper gastrointestinal (GI) endoscopy, or an esophagogastroduodenoscopy (EGD)  It is a procedure to examine the inside of your esophagus, stomach, and duodenum (first part of the small intestine) with a scope  You may feel bloated, gassy, or have some abdominal discomfort after your procedure  Your throat may be sore for 24 to 36 hours  You may burp or pass gas from air that is still inside your body  DISCHARGE INSTRUCTIONS:   Seek care immediately if:    You have sudden, severe abdominal pain   You have problems swallowing   You have a large amount of black, sticky bowel movements or blood in your bowel movements   You have sudden trouble breathing   You feel weak, lightheaded, or faint or your heart beats faster than normal for you  Contact your healthcare provider if:    You have a fever and chills   You have nausea or are vomiting   Your abdomen is bloated or feels full and hard   You have abdominal pain   You have black, sticky bowel movements or blood in your bowel movements   You have not had a bowel movement for 3 days after your procedure   You have rash or hives   You have questions or concerns about your procedure  Activity:    Do not lift, strain, or run for 24 hours after your procedure   Rest after your procedure  You have been given medicine to relax you  Do not drive or make important decisions until the day after your procedure  Return to your normal activity as directed   Relieve gas and discomfort from bloating by lying on your right side with a heating pad on your abdomen  You may need to take short walks to help the gas move out  Eat small meals until bloating is relieved  Follow up with your healthcare provider as directed: Write down your questions so you remember to ask them during your visits       If you take a blood thinner, please review the specific instructions from your endoscopist about when you should resume it  These can be found in the Recommendation and Your Medication list sections of this After Visit Summary  Colonoscopy   WHAT YOU NEED TO KNOW:   A colonoscopy is a procedure to examine the inside of your colon (intestine) with a scope  Polyps or tissue growths may have been removed during your colonoscopy  It is normal to feel bloated and to have some abdominal discomfort  You should be passing gas  If you have hemorrhoids or you had polyps removed, you may have a small amount of bleeding  DISCHARGE INSTRUCTIONS:   Seek care immediately if:    You have sudden, severe abdominal pain   You have problems swallowing   You have a large amount of black, sticky bowel movements or blood in your bowel movements   You have sudden trouble breathing   You feel weak, lightheaded, or faint or your heart beats faster than normal for you  Contact your healthcare provider if:    You have a fever and chills   You have nausea or are vomiting   Your abdomen is bloated or feels full and hard   You have abdominal pain   You have black, sticky bowel movements or blood in your bowel movements   You have not had a bowel movement for 3 days after your procedure   You have rash or hives   You have questions or concerns about your procedure  Activity:    Do not lift, strain, or run for 24 hours after your procedure   Rest after your procedure  You have been given medicine to relax you  Do not drive or make important decisions until the day after your procedure  Return to your normal activity as directed   Relieve gas and discomfort from bloating by lying on your right side with a heating pad on your abdomen  You may need to take short walks to help the gas move out  Eat small meals until bloating is relieved    Follow up with your healthcare provider as directed: Write down your questions so you remember to ask them during your visits  If you take a blood thinner, please review the specific instructions from your endoscopist about when you should resume it  These can be found in the Recommendation and Your Medication list sections of this After Visit Summary  Hemorrhoids   WHAT YOU NEED TO KNOW:   What are hemorrhoids? Hemorrhoids are swollen blood vessels inside your rectum (internal hemorrhoids) or on your anus (external hemorrhoids)  Sometimes a hemorrhoid may prolapse  This means it extends out of your anus  What increases my risk for hemorrhoids? · Pregnancy or obesity    · Straining or sitting for a long time during bowel movements    · Liver disease    · Weak muscles around the anus caused by older age, rectal surgery, or anal intercourse    · A lack of physical activity    · Chronic diarrhea or constipation    · A low-fiber diet    What are the signs and symptoms of hemorrhoids? · Pain or itching around your anus or inside your rectum    · Swelling or bumps around your anus    · Bright red blood in your bowel movement, on the toilet paper, or in the toilet bowl    · Tissue bulging out of your anus (prolapsed hemorrhoids)    · Incontinence (poor control over urine or bowel movements)    How are hemorrhoids diagnosed? Your healthcare provider will ask about your symptoms, the foods you eat, and your bowel movements  He or she will examine your anus for external hemorrhoids  You may need the following:  · A digital rectal exam  is a test to check for hemorrhoids  Your healthcare provider will put a gloved finger inside your anus to feel for the hemorrhoids  · An anoscopy  is a test that uses a scope (small tube with a light and camera on the end) to look at your hemorrhoids  How are hemorrhoids treated? Treatment will depend on your symptoms   You may need any of the following:  · Medicines  can help decrease pain and swelling, and soften your bowel movement  The medicine may be a pill, pad, cream, or ointment  · Procedures  may be used to shrink or remove your hemorrhoid  Examples include rubber-band ligation, sclerotherapy, and photocoagulation  These procedures may be done in your healthcare provider's office  Ask your healthcare provider for more information about these procedures  · Surgery  may be needed to shrink or remove your hemorrhoids  How can I manage my symptoms? · Apply ice on your anus for 15 to 20 minutes every hour or as directed  Use an ice pack, or put crushed ice in a plastic bag  Cover it with a towel before you apply it to your anus  Ice helps prevent tissue damage and decreases swelling and pain  · Take a sitz bath  Fill a bathtub with 4 to 6 inches of warm water  You may also use a sitz bath pan that fits inside a toilet bowl  Sit in the sitz bath for 15 minutes  Do this 3 times a day, and after each bowel movement  The warm water can help decrease pain and swelling  · Keep your anal area clean  Gently wash the area with warm water daily  Soap may irritate the area  After a bowel movement, wipe with moist towelettes or wet toilet paper  Dry toilet paper can irritate the area  How can I help prevent hemorrhoids? · Do not strain to have a bowel movement  Do not sit on the toilet too long  These actions can increase pressure on the tissues in your rectum and anus  · Drink plenty of liquids  Liquids can help prevent constipation  Ask how much liquid to drink each day and which liquids are best for you  · Eat a variety of high-fiber foods  Examples include fruits, vegetables, and whole grains  Ask your healthcare provider how much fiber you need each day  You may need to take a fiber supplement  · Exercise as directed  Exercise, such as walking, may make it easier to have a bowel movement  Ask your healthcare provider to help you create an exercise plan  · Do not have anal sex    Anal sex can weaken the skin around your rectum and anus  · Avoid heavy lifting  This can cause straining and increase your risk for another hemorrhoid  When should I seek immediate care? · You have severe pain in your rectum or around your anus  · You have severe pain in your abdomen and you are vomiting  · You have bleeding from your anus that soaks through your underwear  When should I contact my healthcare provider? · You have frequent and painful bowel movements  · Your hemorrhoid looks or feels more swollen than usual      · You do not have a bowel movement for 2 days or more  · You see or feel tissue coming through your anus  · You have questions or concerns about your condition or care  CARE AGREEMENT:   You have the right to help plan your care  Learn about your health condition and how it may be treated  Discuss treatment options with your healthcare providers to decide what care you want to receive  You always have the right to refuse treatment  The above information is an  only  It is not intended as medical advice for individual conditions or treatments  Talk to your doctor, nurse or pharmacist before following any medical regimen to see if it is safe and effective for you  © Copyright [employer] 2022 Information is for End User's use only and may not be sold, redistributed or otherwise used for commercial purposes  All illustrations and images included in CareNotes® are the copyrighted property of [employer]  or Kell Neves   Diverticulosis   WHAT YOU NEED TO KNOW:   What is diverticulosis? Diverticulosis is a condition that causes small pockets called diverticula to form in your intestine  These pockets make it difficult for bowel movements to pass through your digestive system  What causes diverticulosis? Diverticula form when muscles have to work hard to move bowel movements through the intestine   The force causes bulges to form at weak areas in the intestine  This may happen if you eat foods that are low in fiber  Fiber helps give your bowel movements more bulk so they are larger and easier to move through your colon  The following may increase your risk of diverticulosis:  · A history of constipation    · Age 36 or older    · Obesity    · Lack of exercise    What are the signs and symptoms of diverticulosis? Diverticulosis usually does not cause any signs or symptoms  It may cause any of the following in some people:  · Pain or discomfort in your lower abdomen    · Abdominal bloating    · Constipation or diarrhea    How is diverticulosis diagnosed? Your healthcare provider will examine you and ask about your bowel movements, diet, and symptoms  He or she will also ask about any medical conditions you have or medicines you take  You may need any of the following:  · Blood tests  may be done to check for signs of inflammation  · A barium enema  is an x-ray of your colon that may show diverticula  A tube is put into your anus, and a liquid called barium is put through the tube  Barium is used so that healthcare providers can see your colon more clearly  · Flexible sigmoidoscopy  is a test to look for any changes in your lower intestines and rectum  It may also show the cause of any bleeding or pain  A soft, bendable tube with a light on the end will be put into your anus  It will then be moved forward into your intestine  · A colonoscopy  is used to look at your whole colon  A scope (long bendable tube with a light on the end) is used to take pictures  This test may show diverticula  · A CT scan , or CAT scan, may show diverticula  You may be given contrast liquid before the scan  Tell the healthcare provider if you have ever had an allergic reaction to contrast liquid  How is diverticulosis managed? The goal of treatment is to manage any symptoms you have and prevent other problems such as diverticulitis   Diverticulitis is swelling or infection of the diverticula  Your healthcare provider may recommend any of the following:  · Eat a variety of high-fiber foods  High-fiber foods help you have regular bowel movements  High-fiber foods include cooked beans, fruits, vegetables, and some cereals  Most adults need 25 to 35 grams of fiber each day  Your healthcare provider may recommend that you have more  Ask your healthcare provider how much fiber you need  Increase fiber slowly  You may have abdominal discomfort, bloating, and gas if you add fiber to your diet too quickly  You may need to take a fiber supplement if you are not getting enough fiber from food  · Medicines  to soften your bowel movements may be given  You may also need medicines to treat symptoms such as bloating and pain  · Drink liquids as directed  You may need to drink 2 to 3 liters (8 to 12 cups) of liquids every day  Ask your healthcare provider how much liquid to drink each day and which liquids are best for you  · Apply heat  on your abdomen for 20 to 30 minutes every 2 hours for as many days as directed  Heat helps decrease pain and muscle spasms  How can I help prevent diverticulitis or other symptoms? The following may help decrease your risk for diverticulitis or symptoms, such as bleeding  Talk to your provider about these or other things you can do to prevent problems that may occur with diverticulosis  · Exercise regularly  Ask your healthcare provider about the best exercise plan for you  Exercise can help you have regular bowel movements  Get 30 minutes of exercise on most days of the week  · Maintain a healthy weight  Ask your healthcare provider what a healthy weight is for you  Ask him or her to help you create a weight loss plan if you are overweight  · Do not smoke  Nicotine and other chemicals in cigarettes increase your risk for diverticulitis   Ask your healthcare provider for information if you currently smoke and need help to quit  E-cigarettes or smokeless tobacco still contain nicotine  Talk to your healthcare provider before you use these products  · Ask your healthcare provider if it is safe to take NSAIDs  NSAIDs may increase your risk of diverticulitis  When should I seek immediate care? · You have severe pain on the left side of your lower abdomen  · Your bowel movements are bright or dark red  When should I call my doctor? · You have a fever and chills  · You feel dizzy or lightheaded  · You have nausea, or you are vomiting  · You have a change in your bowel movements  · You have questions or concerns about your condition or care  CARE AGREEMENT:   You have the right to help plan your care  Learn about your health condition and how it may be treated  Discuss treatment options with your healthcare providers to decide what care you want to receive  You always have the right to refuse treatment  The above information is an  only  It is not intended as medical advice for individual conditions or treatments  Talk to your doctor, nurse or pharmacist before following any medical regimen to see if it is safe and effective for you  © Copyright [employer] 2022 Information is for End User's use only and may not be sold, redistributed or otherwise used for commercial purposes   All illustrations and images included in CareNotes® are the copyrighted property of [employer]  or [address] [place] [address] [employer] ([employer]: [place])

## 2022-04-19 NOTE — ANESTHESIA POSTPROCEDURE EVALUATION
Post-Op Assessment Note    CV Status:  Stable  Pain Score: 1    Pain management: adequate     Mental Status:  Alert and awake   Hydration Status:  Euvolemic   PONV Controlled:  Controlled   Airway Patency:  Patent      Post Op Vitals Reviewed: Yes      Staff: CRNA, Anesthesiologist         No complications documented      BP   92/53   Temp      Pulse 55   Resp   17   SpO2   97

## 2022-04-22 ENCOUNTER — CONSULT (OUTPATIENT)
Dept: SURGERY | Facility: HOSPITAL | Age: 72
End: 2022-04-22
Payer: COMMERCIAL

## 2022-04-22 VITALS
BODY MASS INDEX: 21.17 KG/M2 | TEMPERATURE: 96.8 F | WEIGHT: 105.02 LBS | RESPIRATION RATE: 16 BRPM | HEART RATE: 58 BPM | DIASTOLIC BLOOD PRESSURE: 78 MMHG | HEIGHT: 59 IN | SYSTOLIC BLOOD PRESSURE: 131 MMHG

## 2022-04-22 DIAGNOSIS — K82.8 BILIARY DYSKINESIA: Primary | ICD-10-CM

## 2022-04-22 PROCEDURE — 99203 OFFICE O/P NEW LOW 30 MIN: CPT | Performed by: PHYSICIAN ASSISTANT

## 2022-04-22 NOTE — PROGRESS NOTES
Assessment/Plan:   Merlin Cottrell is a 73 y  o female who is here for evaluation  Patient was referred by her gastroenterologist after recent finding of cholelithiasis with decreased ejection fraction on HIDA scan  Cholelithiasis with biliary dyskinesia  -recent HIDA scan with gallbladder ejection fraction of 12%, discussed findings with patient  -patient denies issues with abdominal pain  No difficulty with diet  Appetite is fair  No nausea or vomiting  No cramping or reflux symptoms   -discussed cholecystectomy however patient feels that she is asymptomatic and does not want to proceed with surgery at this time  -patient will keep a log at home of any abdominal discomfort  -she will return to the office if she develops any abdominal symptoms especially after eating  -she will call with any questions or concerns    Weight loss  -patient is being followed by Gastroenterology  -recent EGD and colonoscopy without significant findings  -continue to increase caloric intake as tolerated  -continue to monitor weight      HPI:  Merlin Cottrell is a 73 y  o female who was referred for evaluation of cholelithiasis with decreased GB ejection fraction on HIDA scan  Patient was seen in the emergency department in February for evaluation of right back pain  She underwent a CT scan with findings of cholelithiasis  She was referred to Gastroenterology for further workup  She also noted recent weight loss  She did have a follow-up HIDA scan with a decreased ejection fraction at 12%  She underwent an EGD and colonoscopy without significant findings  Patient states her back pain has not returned  She denies any history of abdominal pain  She denies any abdominal discomfort after eating fatty meals  She denies any nausea or vomiting  States her weight has been stable but still decreased from her normal weight of 115 lbs  She denies any fevers or chills        Prior Colonoscopy : 4/1/22   Prior Abdominal Surgery: Tubal ligation  Anticoagulation: none  Imaging: CT scan, HIDA scan as above    ROS:  General ROS: negative for - chills, fatigue, fever or night sweats, weight loss  Respiratory ROS: no cough, shortness of breath, or wheezing  Cardiovascular ROS: no chest pain or dyspnea on exertion  Abdomen ROS: as above  Genito-Urinary ROS: no dysuria, trouble voiding, or hematuria  Musculoskeletal ROS: negative for - gait disturbance, joint pain or muscle pain  Neurological ROS: no TIA or stroke symptoms  Gastrointestinal ROS: See HPI   Skin ROS: no rashes, lesions, open wounds    ALLERGIES  Patient has no known allergies  Current Outpatient Medications:     Calcium Ascorbate (BUFFERED C PO), Take by mouth, Disp: , Rfl:     Cholecalciferol 125 MCG (5000 UT) TABS, Take by mouth, Disp: , Rfl:     ECHINACEA-DELGADILLO SEAL PO, Take by mouth, Disp: , Rfl:     Omega-3 Fatty Acids (SUPER OMEGA 3 EPA/DHA PO), Take by mouth, Disp: , Rfl:     SUPER B COMPLEX/C PO, Take by mouth, Disp: , Rfl:     ZINC PICOLINATE PO, Take by mouth, Disp: , Rfl:     dicyclomine (BENTYL) 20 mg tablet, Take 1 tablet (20 mg total) by mouth 2 (two) times a day as needed (abdominal pain), Disp: 12 tablet, Rfl: 0    GLUCOSAMINE SULFATE/MSM PO, Take by mouth (Patient not taking: Reported on 4/19/2022 ), Disp: , Rfl:     OMEGA-3 FATTY ACIDS PO, Take by mouth, Disp: , Rfl:     ondansetron (Zofran ODT) 4 mg disintegrating tablet, Take 1 tablet (4 mg total) by mouth every 6 (six) hours as needed for nausea or vomiting, Disp: 20 tablet, Rfl: 0    patient supplied medication, Triphala, Disp: , Rfl:     patient supplied medication, Collagen peptides, Disp: , Rfl:   No current facility-administered medications for this visit      Facility-Administered Medications Ordered in Other Visits:     sodium chloride 0 9 % infusion, 50 mL/hr, Intravenous, Continuous, Charly Knapp MD, Last Rate: 50 mL/hr at 04/19/22 0852, 50 mL/hr at 04/19/22 1631  Past Medical History:   Diagnosis Date    Gall stones      Past Surgical History:   Procedure Laterality Date    ANKLE SURGERY      COLONOSCOPY      TUBAL LIGATION       Family History   Problem Relation Age of Onset   Ethelyn [place] COPD Mother     Hyperlipidemia Mother     Colon polyps Father     Hypertension Father     Hyperlipidemia Father     Hypertension Sister     Hyperlipidemia Sister     Colon cancer Neg Hx       reports that she has never smoked  She has never used smokeless tobacco  She reports previous alcohol use  She reports that she does not use drugs  Exam:   Vitals:    04/22/22 0933   BP: 131/78   Pulse: 58   Resp: 16   Temp: (!) 96 8 °F (36 °C)     Weight (last 2 days)     Date/Time Weight    04/22/22 0933 47 6 (105 02)          PHYSICAL EXAM  General Appearance:    Alert, cooperative, no distress, well-appearing   Head:    Normocephalic without obvious abnormality   Eyes:    Conjunctiva/corneas clear, EOM's intact        Neck:   Supple, no adenopathy, no JVD   Back:     Symmetric, no spinal or CVA tenderness   Lungs:     Clear to auscultation bilaterally, no wheezing or rhonchi   Heart:    Regular rate and rhythm, S1 and S2 normal, no murmur   Abdomen:     Flat,soft, non tender, non distended, active BS   Extremities:   Extremities normal  No clubbing, cyanosis or edema   Psych:   Normal Affect, AOx3  Neurologic:  Skin:   CNII-XII intact   Strength symmetric, speech intact    Warm, dry, intact, no visible rashes or lesions         Jewel Cruz PA-C

## 2022-04-22 NOTE — PATIENT INSTRUCTIONS
Findings consistent with gallstones and mild decrease functioning gallbladder  No indication for immediate surgical intervention  Monitor for abdominal pain especially after eating  Call if you develop symptoms  Call with any questions or concerns

## 2022-04-25 ENCOUNTER — TELEPHONE (OUTPATIENT)
Dept: OTHER | Facility: OTHER | Age: 72
End: 2022-04-25

## 2022-04-25 NOTE — TELEPHONE ENCOUNTER
Patient called to cancel appointment she does not feel the need to have the follow up appointment       Tuesday May 3, 2022   2:00 PM FOLLOW UP PG   ALIYAH Booker PG [address] [facility]